# Patient Record
Sex: FEMALE | URBAN - METROPOLITAN AREA
[De-identification: names, ages, dates, MRNs, and addresses within clinical notes are randomized per-mention and may not be internally consistent; named-entity substitution may affect disease eponyms.]

---

## 2018-04-05 PROBLEM — 16331000 HEARTBURN: Status: ACTIVE | Noted: 2018-04-05

## 2018-04-05 PROBLEM — 271737000 ANEMIA: Status: ACTIVE | Noted: 2018-04-05

## 2018-04-05 PROBLEM — 422400008 VOMITING: Status: ACTIVE | Noted: 2018-04-05

## 2018-04-05 PROBLEM — 267024001 ABNORMAL WEIGHT LOSS: Status: ACTIVE | Noted: 2018-04-05

## 2018-04-05 PROBLEM — 271834000 ERUCTATION: Status: ACTIVE | Noted: 2018-04-05

## 2018-04-05 PROBLEM — 166318006 BLOOD CHEMISTRY ABNORMAL: Status: ACTIVE | Noted: 2018-04-05

## 2018-04-05 PROBLEM — 271832001 FLATULENCE, ERUCTATION AND GAS PAIN: Status: ACTIVE | Noted: 2018-04-05

## 2018-04-05 PROBLEM — 442076002 EARLY SATIETY: Status: ACTIVE | Noted: 2018-04-05

## 2020-06-16 ENCOUNTER — OFFICE VISIT (OUTPATIENT)
Dept: URBAN - METROPOLITAN AREA CLINIC 23 | Facility: CLINIC | Age: 30
End: 2020-06-16
Payer: COMMERCIAL

## 2020-06-16 ENCOUNTER — DASHBOARD ENCOUNTERS (OUTPATIENT)
Age: 30
End: 2020-06-16

## 2020-06-16 DIAGNOSIS — K22.0 ACHALASIA: ICD-10-CM

## 2020-06-16 DIAGNOSIS — K31.84 GASTROPARESIS: ICD-10-CM

## 2020-06-16 DIAGNOSIS — K59.01 CONSTIPATION: ICD-10-CM

## 2020-06-16 DIAGNOSIS — R12 HEARTBURN: ICD-10-CM

## 2020-06-16 PROCEDURE — 1036F TOBACCO NON-USER: CPT | Performed by: INTERNAL MEDICINE

## 2020-06-16 PROCEDURE — 99213 OFFICE O/P EST LOW 20 MIN: CPT | Performed by: INTERNAL MEDICINE

## 2020-06-16 PROCEDURE — G8427 DOCREV CUR MEDS BY ELIG CLIN: HCPCS | Performed by: INTERNAL MEDICINE

## 2020-06-16 RX ORDER — ERYTHROMYCIN 250 MG/1
250 TABLET, FILM COATED ORAL TID
Qty: 40 | Refills: 8
Start: 2020-04-27 | End: 2020-08-31

## 2020-06-16 RX ORDER — LINACLOTIDE 145 UG/1
TAKE 1 CAPSULE (145 MCG) BY ORAL ROUTE ONCE DAILY CAPSULE, GELATIN COATED ORAL 1
Qty: 30 | Refills: 6 | Status: ACTIVE | COMMUNITY
Start: 2020-04-02 | End: 2020-10-29

## 2020-06-16 NOTE — HPI-TODAY'S VISIT:
29 y/o female  who presents For  Follow-upUpper endoscopy performed May 14 /2020 persistent heartburn belching and chest pain endoscopy revealed complete loss of peristalsis of the esophagus. Dilated esophagus. Small Schatzki ring in the distal esophagus with evidence of Nissen fundoplication. The GE junction was dilated with a balloon to 20 mm, biopsy of the stomach or duodenum were unremarkable biopsy of the esophagus revealed mild reflux esophagitis she is here for Follow-up she continued to have belching postprandial bloating and early satiety. She has history of achalasia and gastroparesis she responded to erythromycin   Patient has complicated past medical history,in 2007 she was diagnosed with achalasia in [place] had Heller myotomy after that she developed severe reflux had another surgery with the Nissen fundoplication in 2014 in [place].After the fundoplication she did well till about 2016 she started having recurrent chest pain the pain was severe in the substernal area had multiple visits to the emergency room, she was seen by multiple gastroenterologist ,  she had gastric emptying study before and was told she had a gastroparesis  Now she is complaining of chronic constipation with 1  bowel movement every other day  Reports no significant improvement after the dilation and the endoscopy continued to have belching with early satiety

## 2020-06-18 PROBLEM — 48531003 ACHALASIA: Status: ACTIVE | Noted: 2020-06-18

## 2020-06-18 PROBLEM — 235675006 GASTROPARESIS: Status: ACTIVE | Noted: 2020-06-16

## 2020-07-07 ENCOUNTER — TELEPHONE ENCOUNTER (OUTPATIENT)
Dept: URBAN - METROPOLITAN AREA CLINIC 35 | Facility: CLINIC | Age: 30
End: 2020-07-07

## 2020-07-07 ENCOUNTER — TELEPHONE ENCOUNTER (OUTPATIENT)
Dept: URBAN - METROPOLITAN AREA CLINIC 23 | Facility: CLINIC | Age: 30
End: 2020-07-07

## 2020-08-04 ENCOUNTER — WEB ENCOUNTER (OUTPATIENT)
Dept: URBAN - METROPOLITAN AREA CLINIC 23 | Facility: CLINIC | Age: 30
End: 2020-08-04

## 2020-08-04 RX ORDER — ERYTHROMYCIN 250 MG/1
AS DIRECTED TABLET, FILM COATED ORAL THREE TIMES A DAY
Qty: 270 | Refills: 3

## 2021-06-29 ENCOUNTER — WEB ENCOUNTER (OUTPATIENT)
Dept: URBAN - METROPOLITAN AREA CLINIC 23 | Facility: CLINIC | Age: 31
End: 2021-06-29

## 2021-07-06 ENCOUNTER — WEB ENCOUNTER (OUTPATIENT)
Dept: URBAN - METROPOLITAN AREA CLINIC 23 | Facility: CLINIC | Age: 31
End: 2021-07-06

## 2021-07-06 ENCOUNTER — TELEPHONE ENCOUNTER (OUTPATIENT)
Dept: URBAN - METROPOLITAN AREA CLINIC 23 | Facility: CLINIC | Age: 31
End: 2021-07-06

## 2021-07-08 ENCOUNTER — WEB ENCOUNTER (OUTPATIENT)
Dept: URBAN - METROPOLITAN AREA CLINIC 23 | Facility: CLINIC | Age: 31
End: 2021-07-08

## 2021-07-10 ENCOUNTER — WEB ENCOUNTER (OUTPATIENT)
Dept: URBAN - METROPOLITAN AREA CLINIC 23 | Facility: CLINIC | Age: 31
End: 2021-07-10

## 2021-07-18 ENCOUNTER — WEB ENCOUNTER (OUTPATIENT)
Dept: URBAN - METROPOLITAN AREA CLINIC 23 | Facility: CLINIC | Age: 31
End: 2021-07-18

## 2021-07-19 ENCOUNTER — WEB ENCOUNTER (OUTPATIENT)
Dept: URBAN - METROPOLITAN AREA CLINIC 23 | Facility: CLINIC | Age: 31
End: 2021-07-19

## 2021-07-21 ENCOUNTER — WEB ENCOUNTER (OUTPATIENT)
Dept: URBAN - METROPOLITAN AREA CLINIC 23 | Facility: CLINIC | Age: 31
End: 2021-07-21

## 2021-07-22 ENCOUNTER — WEB ENCOUNTER (OUTPATIENT)
Dept: URBAN - METROPOLITAN AREA CLINIC 23 | Facility: CLINIC | Age: 31
End: 2021-07-22

## 2021-07-27 ENCOUNTER — WEB ENCOUNTER (OUTPATIENT)
Dept: URBAN - METROPOLITAN AREA CLINIC 23 | Facility: CLINIC | Age: 31
End: 2021-07-27
